# Patient Record
Sex: MALE | Race: WHITE
[De-identification: names, ages, dates, MRNs, and addresses within clinical notes are randomized per-mention and may not be internally consistent; named-entity substitution may affect disease eponyms.]

---

## 2020-02-24 ENCOUNTER — HOSPITAL ENCOUNTER (OUTPATIENT)
Dept: HOSPITAL 92 - LABBT | Age: 70
Discharge: HOME | End: 2020-02-24
Attending: ORTHOPAEDIC SURGERY
Payer: MEDICARE

## 2020-02-24 DIAGNOSIS — Z01.818: Primary | ICD-10-CM

## 2020-02-24 DIAGNOSIS — R22.32: ICD-10-CM

## 2020-02-24 LAB
ANION GAP SERPL CALC-SCNC: 15 MMOL/L (ref 10–20)
BASOPHILS # BLD AUTO: 0 THOU/UL (ref 0–0.2)
BASOPHILS NFR BLD AUTO: 0.5 % (ref 0–1)
BUN SERPL-MCNC: 23 MG/DL (ref 8.4–25.7)
CALCIUM SERPL-MCNC: 9.3 MG/DL (ref 7.8–10.44)
CHLORIDE SERPL-SCNC: 99 MMOL/L (ref 98–107)
CO2 SERPL-SCNC: 27 MMOL/L (ref 23–31)
CREAT CL PREDICTED SERPL C-G-VRATE: 0 ML/MIN (ref 70–130)
EOSINOPHIL # BLD AUTO: 0.2 THOU/UL (ref 0–0.7)
EOSINOPHIL NFR BLD AUTO: 2.6 % (ref 0–10)
GLUCOSE SERPL-MCNC: 93 MG/DL (ref 80–115)
HGB BLD-MCNC: 15.3 G/DL (ref 14–18)
LYMPHOCYTES # BLD: 2.3 THOU/UL (ref 1.2–3.4)
LYMPHOCYTES NFR BLD AUTO: 34.8 % (ref 21–51)
MCH RBC QN AUTO: 34.7 PG (ref 27–31)
MCV RBC AUTO: 99.7 FL (ref 78–98)
MONOCYTES # BLD AUTO: 0.5 THOU/UL (ref 0.11–0.59)
MONOCYTES NFR BLD AUTO: 6.9 % (ref 0–10)
NEUTROPHILS # BLD AUTO: 3.7 THOU/UL (ref 1.4–6.5)
NEUTROPHILS NFR BLD AUTO: 55.2 % (ref 42–75)
PLATELET # BLD AUTO: 136 THOU/UL (ref 130–400)
POTASSIUM SERPL-SCNC: 3.6 MMOL/L (ref 3.5–5.1)
RBC # BLD AUTO: 4.42 MILL/UL (ref 4.7–6.1)
SODIUM SERPL-SCNC: 137 MMOL/L (ref 136–145)
WBC # BLD AUTO: 6.7 THOU/UL (ref 4.8–10.8)

## 2020-02-24 PROCEDURE — 85025 COMPLETE CBC W/AUTO DIFF WBC: CPT

## 2020-02-24 PROCEDURE — 80048 BASIC METABOLIC PNL TOTAL CA: CPT

## 2020-02-24 PROCEDURE — 93005 ELECTROCARDIOGRAM TRACING: CPT

## 2020-02-24 PROCEDURE — 93010 ELECTROCARDIOGRAM REPORT: CPT

## 2020-02-24 NOTE — EKG
Test Reason : 

Blood Pressure : ***/*** mmHG

Vent. Rate : 053 BPM     Atrial Rate : 053 BPM

   P-R Int : 206 ms          QRS Dur : 110 ms

    QT Int : 438 ms       P-R-T Axes : 048 -08 -13 degrees

   QTc Int : 410 ms

 

Sinus bradycardia

Nonspecific ST-T changes

No previous ECGs available

Confirmed by DR. LEIJAH CRAMER ([id number]) on 2/24/2020 4:35:14 PM

 

Referred By:  AMANDA           Confirmed By:DR. ELIJAH CRAMER

## 2020-02-25 ENCOUNTER — HOSPITAL ENCOUNTER (OUTPATIENT)
Dept: HOSPITAL 92 - SDC | Age: 70
Discharge: HOME | End: 2020-02-25
Attending: ORTHOPAEDIC SURGERY
Payer: MEDICARE

## 2020-02-25 VITALS — BODY MASS INDEX: 25.1 KG/M2

## 2020-02-25 DIAGNOSIS — I10: ICD-10-CM

## 2020-02-25 DIAGNOSIS — Z79.82: ICD-10-CM

## 2020-02-25 DIAGNOSIS — E78.00: ICD-10-CM

## 2020-02-25 DIAGNOSIS — L57.0: ICD-10-CM

## 2020-02-25 DIAGNOSIS — Z91.018: ICD-10-CM

## 2020-02-25 DIAGNOSIS — Z79.899: ICD-10-CM

## 2020-02-25 DIAGNOSIS — C44.629: Primary | ICD-10-CM

## 2020-02-25 DIAGNOSIS — M19.041: ICD-10-CM

## 2020-02-25 DIAGNOSIS — Z87.891: ICD-10-CM

## 2020-02-25 DIAGNOSIS — M18.11: ICD-10-CM

## 2020-02-25 DIAGNOSIS — E78.5: ICD-10-CM

## 2020-02-25 PROCEDURE — 88332 PATH CONSLTJ SURG EA ADD BLK: CPT

## 2020-02-25 PROCEDURE — 0HRGX74 REPLACEMENT OF LEFT HAND SKIN WITH AUTOLOGOUS TISSUE SUBSTITUTE, PARTIAL THICKNESS, EXTERNAL APPROACH: ICD-10-PCS | Performed by: ORTHOPAEDIC SURGERY

## 2020-02-25 PROCEDURE — 88305 TISSUE EXAM BY PATHOLOGIST: CPT

## 2020-02-25 PROCEDURE — 88331 PATH CONSLTJ SURG 1 BLK 1SPC: CPT

## 2020-02-25 PROCEDURE — S0020 INJECTION, BUPIVICAINE HYDRO: HCPCS

## 2020-02-25 NOTE — OP
DATE OF PROCEDURE:  02/25/2020



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



POSTOPERATIVE DIAGNOSIS:  Squamous cell carcinoma, same size above.



FINDINGS:  Intraoperative sent to Pathology, fresh frozen specimen of all quadrants,

free of tumor with a 3 mm margin __________ differentiated squamous cell carcinoma. 



PROCEDURES PERFORMED:  

1. Excision, 3.0 cm malignant lesion (squamous cell carcinoma) from dorsal ulnar

right hand. 

2. Application of split-thickness skin graft, harvested from the ipsilateral

antecubital fossa. 



COMPLICATIONS:  None.



ESTIMATED BLOOD LOSS:  Less than 10 mL.



TOURNIQUET TIME:  15 minutes.



ANESTHESIA:  IV conscious sedation, augmented by 30 mL total of 0.5% Marcaine

without epinephrine. 



DESCRIPTION OF PROCEDURE:  After successful anesthesia listed above, the limb was

prepped and draped.  Time-out was done appropriately.  He then underwent his

injection where 20 mL given at the donor site total and 10 at the harvest site for

the graft antecubital fossa of the ipsilateral right elbow.  We then had

confirmation that the patient had excellent pain relief, then measuring a 3 mm

margin circumferentially in all 4 quadrants.  We elevated the mass.  It did not

penetrate the fascia and was not deep.  It appeared clinically like a squamous cell

carcinoma that __________ precancerous lesion. 



We had marked all the marks with appropriate colors and documented this.  We went to

Pathology and released the tourniquet.  I held hemostasis until we heard from

pathology 22 minutes later. 



At this point, when we told that the squamous cell carcinoma proven and we had

margins free of tumor, we thanked the pathologist, then obtained hemostasis.  We

closed the wound with 3 individual stitches to each of the 2 quadrants, turning the

square to rhomboid and then harvested a 2.5 x 1 cm graft that would easily cover

this area full thickness from the antecubital fossa of the same side.  We closed the

donor site with a running 3-0 Monocryl, and then for the epidermis, we used

Dermabond.  We then attached each of the four quadrants of the graft with a 4-0

nylon, which was served as a bolster suture, ran a chromic suture, and then placed a

bolster with bacitracin against the graft, Adaptic, mineral oil soaked cotton ball,

and then we tied the bolster.  We then placed 4x4s on both wounds with dry Adaptic

between the 4x4 and the dry fibrin glue.  A Kerlix was then used and then Ace wrap

to cover from the distal arm/elbow away to the MCP joint.  The patient left the

operating room without evidence of anesthetic or operative complication.  We

explained the pathology report to the patient's wife in the recovery area. 







Job ID:  [id number]